# Patient Record
Sex: MALE | NOT HISPANIC OR LATINO
[De-identification: names, ages, dates, MRNs, and addresses within clinical notes are randomized per-mention and may not be internally consistent; named-entity substitution may affect disease eponyms.]

---

## 2021-11-16 ENCOUNTER — APPOINTMENT (OUTPATIENT)
Dept: SURGERY | Facility: CLINIC | Age: 36
End: 2021-11-16
Payer: MEDICAID

## 2021-11-16 VITALS
RESPIRATION RATE: 18 BRPM | HEART RATE: 69 BPM | WEIGHT: 225 LBS | SYSTOLIC BLOOD PRESSURE: 109 MMHG | OXYGEN SATURATION: 99 % | DIASTOLIC BLOOD PRESSURE: 64 MMHG

## 2021-11-16 DIAGNOSIS — Z78.9 OTHER SPECIFIED HEALTH STATUS: ICD-10-CM

## 2021-11-16 DIAGNOSIS — K62.5 HEMORRHAGE OF ANUS AND RECTUM: ICD-10-CM

## 2021-11-16 DIAGNOSIS — Z82.49 FAMILY HISTORY OF ISCHEMIC HEART DISEASE AND OTHER DISEASES OF THE CIRCULATORY SYSTEM: ICD-10-CM

## 2021-11-16 DIAGNOSIS — Z87.01 PERSONAL HISTORY OF PNEUMONIA (RECURRENT): ICD-10-CM

## 2021-11-16 DIAGNOSIS — R19.4 CHANGE IN BOWEL HABIT: ICD-10-CM

## 2021-11-16 PROBLEM — Z00.00 ENCOUNTER FOR PREVENTIVE HEALTH EXAMINATION: Status: ACTIVE | Noted: 2021-11-16

## 2021-11-16 PROCEDURE — 99202 OFFICE O/P NEW SF 15 MIN: CPT

## 2021-11-22 PROBLEM — Z82.49 FAMILY HISTORY OF HYPOTENSION: Status: ACTIVE | Noted: 2021-11-16

## 2021-11-22 PROBLEM — R19.4 ALTERED BOWEL HABITS: Status: ACTIVE | Noted: 2021-11-22

## 2021-11-22 PROBLEM — Z87.01 HISTORY OF PNEUMONIA: Status: RESOLVED | Noted: 2021-11-16 | Resolved: 2021-11-22

## 2021-11-22 PROBLEM — K62.5 RECTAL BLEEDING: Status: ACTIVE | Noted: 2021-11-22

## 2021-11-22 PROBLEM — Z78.9 DOES NOT USE TOBACCO: Status: ACTIVE | Noted: 2021-11-16

## 2021-11-22 RX ORDER — UBIDECARENONE/VIT E ACET 100MG-5
CAPSULE ORAL
Refills: 0 | Status: ACTIVE | COMMUNITY

## 2021-11-22 NOTE — ASSESSMENT
[FreeTextEntry1] : 36 M here for initial evaluation for new rectal bleeding with bowel movements and alteraed bowel habits. \par \par Discussed with patient bleeding most likely hemorrhoidal in nature but would recommend colonoscopy to rule out more proximal sources given change in bowel habits. \par \par Will plan for biopsies of colon to rule out microscopic colitis. If only hemorrhoids on colonoscopy will discuss treatment options at that time. \par \par Will schedule for 12/3/21. \par Will get covid testing prior to procedure. \par \par Bowel prep instructions provided.

## 2021-11-22 NOTE — HISTORY OF PRESENT ILLNESS
[FreeTextEntry1] : 36 M here for initial evaluation. Referred by Dr Maldonado. Has had rectal bleeding with most bowel movements. Has also had a change in bowel habits recently with irregular bowel movements and diarrhea soon after eating. Has never had colonoscopy previously. no family history of colon cancer. \par \par No prior abdominal surgeries. No significant medical history. \par \par Denies smoking or drinking.

## 2021-11-22 NOTE — PHYSICAL EXAM
[Abdomen Masses] : No abdominal masses [Abdomen Tenderness] : ~T No ~M abdominal tenderness [Exam Deferred] : exam was deferred [JVD] : no jugular venous distention  [Respiratory Effort] : normal respiratory effort [Normal Rate and Rhythm] : normal rate and rhythm [No Rash or Lesion] : No rash or lesion [Alert] : alert [Calm] : calm [de-identified] : No acute distress

## 2022-03-04 ENCOUNTER — APPOINTMENT (OUTPATIENT)
Dept: SURGERY | Facility: HOSPITAL | Age: 37
End: 2022-03-04